# Patient Record
(demographics unavailable — no encounter records)

---

## 2024-12-12 NOTE — HISTORY OF PRESENT ILLNESS
[de-identified] : ZANE OLMSTEAD is a 21 year old male with a PMH of Hepatic Steatosis, Autism and ADHD.   He presents today for evaluation of elevated LFTs, accompanied by his father. Records reviewed, including notes, labs, imaging, etc. Per pt, LFT elevations have been going on for the last several months. Was seen by GI, work up completed which was essentially unremarkable. US showed fatty liver. Pt went on to have liver biopsy.  Labs 9/2024 - tbili 0.6, AST 46, ALT 81, AP 93 CLD work up - ceruloplasmin 21. Hepatitis A, B and C negative. SMA -. AMA-. A1AT phenotype MM.  US Abdo 9/2024 - moderate hepatic steatosis Liver biopsy 10/7/24 - grade 3 steatosis  Pt was on Lexapro for 1-2 years, currently being switched to Zoloft. Has been on Ritalin for years. Pt has gained weight over the last 2 years, 142 lbs -> 166 lbs. Denies abdominal pain or distension, jaundice, hematemesis, hematochezia, dark urine, confusion or unintentional weight loss. Denies significant alcohol consumption or otc/herbal supplements. Family history unknown, pt adopted.

## 2024-12-12 NOTE — ADDENDUM
[FreeTextEntry1] : I, Alona Salamanca NP, acted as scribe for MILAGRO Baldwin for this patient encounter.

## 2024-12-12 NOTE — PHYSICAL EXAM
[Non-Tender] : non-tender [General Appearance - Alert] : alert [Neck Appearance] : the appearance of the neck was normal [Edema] : there was no peripheral edema [Bowel Sounds] : normal bowel sounds [Abdomen Soft] : soft [Abdomen Tenderness] : non-tender [] : no hepato-splenomegaly [Abdomen Mass (___ Cm)] : no abdominal mass palpated [Oriented To Time, Place, And Person] : oriented to person, place, and time [Scleral Icterus] : No Scleral Icterus [Spider Angioma] : No spider angioma(s) were observed [Abdominal  Ascites] : no ascites [Asterixis] : no asterixis observed [Jaundice] : No jaundice [Palmar Erythema] : no Palmar Erythema

## 2024-12-12 NOTE — HISTORY OF PRESENT ILLNESS
[de-identified] : ZANE OLMSTEAD is a 21 year old male with a PMH of Hepatic Steatosis, Autism and ADHD.   He presents today for evaluation of elevated LFTs, accompanied by his father. Records reviewed, including notes, labs, imaging, etc. Per pt, LFT elevations have been going on for the last several months. Was seen by GI, work up completed which was essentially unremarkable. US showed fatty liver. Pt went on to have liver biopsy.  Labs 9/2024 - tbili 0.6, AST 46, ALT 81, AP 93 CLD work up - ceruloplasmin 21. Hepatitis A, B and C negative. SMA -. AMA-. A1AT phenotype MM.  US Abdo 9/2024 - moderate hepatic steatosis Liver biopsy 10/7/24 - grade 3 steatosis  Pt was on Lexapro for 1-2 years, currently being switched to Zoloft. Has been on Ritalin for years. Pt has gained weight over the last 2 years, 142 lbs -> 166 lbs. Denies abdominal pain or distension, jaundice, hematemesis, hematochezia, dark urine, confusion or unintentional weight loss. Denies significant alcohol consumption or otc/herbal supplements. Family history unknown, pt adopted.

## 2024-12-12 NOTE — ASSESSMENT
[FreeTextEntry1] : ZANE OLMSTEAD is a 21 year old male with a PMH of Hepatic Steatosis, Autism and ADHD.  Elevated LFTs -Labs 9/2024 - tbili 0.6, AST 46, ALT 81, AP 93. US Abdo 9/2024 - moderate hepatic steatosis. Liver biopsy 10/7/24 - grade 3 steatosis. Labs ordered to rule out competing etiologies of liver disease were negative. -Likely MASH -additional labs ordered to r/o less common etiologies of liver disease -US Abdo doppler ordered to r/o PVT  MASH/MASLD -Etiology of metabolic associated steatotic liver disease explained to pt in detail along with complications of disease progression. -Recommend lifestyle modifications in the form of diet and exercise. Gradual weight loss of 7-10% of current body weight. These changes have been shown to lead to regression or even resolution of steatosis, inflammation, and even fibrosis in some patients. -will consider FibroScan at next visit to r/o fibrosis -If LFTs remain elevated despite lifestyle modifications, will consider Liver Biopsy.   Follow up in 1 month w/labs

## 2025-03-25 NOTE — HISTORY OF PRESENT ILLNESS
[FreeTextEntry1] : ZANE OLMSTEAD is a 21 year old male with a PMH of Hepatic Steatosis, Autism and ADHD.   He presents today for evaluation of elevated LFTs, accompanied by his father. Records reviewed, including notes, labs, imaging, etc. Per pt, LFT elevations have been going on for the last several months. Was seen by GI, work up completed which was essentially unremarkable. US showed fatty liver. Pt went on to have liver biopsy.  3/25/2025 Patient presents for follow-up Records reviewed, including notes, labs, imaging, etc. No new liver-related complaints like hematemesis melena jaundice, confusion. No hospitalizations.  Buspirone and guaifenesin has been added to his medications Previsit labs reviewed with the patient.  Scanned in   Labs ordered to rule out various etiologies of liver disease including autoimmune hepatitis, viral hepatitis, alpha-1 antitrypsin deficiency, congestive hepatopathy, Natan's, hemochromatosis are all negative.  LFT elevation due to fatty liver  Labs 9/2024 - tbili 0.6, AST 46, ALT 81, AP 93 CLD work up - ceruloplasmin 21. Hepatitis A, B and C negative. SMA -. AMA-. A1AT phenotype MM.  US Abdo 9/2024 - moderate hepatic steatosis Liver biopsy 10/7/24 - grade 3 steatosis  Pt was on Lexapro for 1-2 years, currently being switched to Zoloft. Has been on Ritalin for years. Pt has gained weight over the last 2 years, 142 lbs -> 166 lbs. Denies abdominal pain or distension, jaundice, hematemesis, hematochezia, dark urine, confusion or unintentional weight loss. Denies significant alcohol consumption or otc/herbal supplements. Family history unknown, pt adopted.

## 2025-03-25 NOTE — ASSESSMENT
[FreeTextEntry1] : ZANE OLMSTEAD is a 21 year old male with a PMH of Hepatic Steatosis, Autism and ADHD.  Elevated LFTs -Labs 9/2024 - tbili 0.6, AST 46, ALT 81, AP 93. US Abdo 9/2024 - moderate hepatic steatosis. Liver biopsy 10/7/24 - grade 3 steatosis. Labs ordered to rule out competing etiologies of liver disease were negative. -Likely MASH -additional labs ordered to r/o less common etiologies of liver disease are negative -US Abdo doppler ordered to r/o PVT does not show any portal vein compromise.  MASH/MASLD -Etiology of metabolic associated steatotic liver disease explained to pt in detail along with complications of disease progression. -Recommend lifestyle modifications in the form of diet and exercise. Gradual weight loss of 7-10% of current body weight. These changes have been shown to lead to regression or even resolution of steatosis, inflammation, and even fibrosis in some patients. -will consider FibroScan at next visit to r/o fibrosis -If LFTs remain elevated despite lifestyle modifications, will consider Liver Biopsy.   Follow up in a year with us or primary care physician